# Patient Record
Sex: FEMALE | Race: WHITE | NOT HISPANIC OR LATINO | Employment: FULL TIME | URBAN - METROPOLITAN AREA
[De-identification: names, ages, dates, MRNs, and addresses within clinical notes are randomized per-mention and may not be internally consistent; named-entity substitution may affect disease eponyms.]

---

## 2017-05-11 ENCOUNTER — TRANSFERRED RECORDS (OUTPATIENT)
Dept: HEALTH INFORMATION MANAGEMENT | Facility: CLINIC | Age: 47
End: 2017-05-11

## 2018-07-30 ENCOUNTER — OFFICE VISIT - HEALTHEAST (OUTPATIENT)
Dept: UROLOGY | Facility: CLINIC | Age: 48
End: 2018-07-30

## 2018-07-30 DIAGNOSIS — N20.0 CALCULUS OF KIDNEY: ICD-10-CM

## 2018-07-30 DIAGNOSIS — N20.9 URINARY CALCULUS: ICD-10-CM

## 2018-07-30 DIAGNOSIS — N20.9 CALCULUS OF URINARY TRACT: ICD-10-CM

## 2018-07-30 DIAGNOSIS — E83.59 NEPHROCALCINOSIS: ICD-10-CM

## 2018-07-30 DIAGNOSIS — N29 NEPHROCALCINOSIS: ICD-10-CM

## 2018-07-30 LAB
ALBUMIN UR-MCNC: NEGATIVE MG/DL
APPEARANCE UR: CLEAR
BILIRUB UR QL STRIP: NEGATIVE
COLOR UR AUTO: YELLOW
GLUCOSE UR STRIP-MCNC: NEGATIVE MG/DL
HGB UR QL STRIP: ABNORMAL
KETONES UR STRIP-MCNC: NEGATIVE MG/DL
LEUKOCYTE ESTERASE UR QL STRIP: NEGATIVE
NITRATE UR QL: NEGATIVE
PH UR STRIP: 7 [PH] (ref 5–8)
SP GR UR STRIP: 1.01 (ref 1–1.03)
UROBILINOGEN UR STRIP-ACNC: ABNORMAL

## 2018-07-30 RX ORDER — ZOLPIDEM TARTRATE 10 MG/1
TABLET ORAL
Refills: 0 | Status: SHIPPED | COMMUNITY
Start: 2018-07-23

## 2018-07-30 RX ORDER — LEVOTHYROXINE SODIUM 100 MCG
100 TABLET ORAL DAILY
Refills: 0 | Status: SHIPPED | COMMUNITY
Start: 2018-07-23

## 2018-07-30 ASSESSMENT — MIFFLIN-ST. JEOR: SCORE: 1250.92

## 2019-10-08 ENCOUNTER — RECORDS - HEALTHEAST (OUTPATIENT)
Dept: ADMINISTRATIVE | Facility: OTHER | Age: 49
End: 2019-10-08

## 2019-11-11 ENCOUNTER — RECORDS - HEALTHEAST (OUTPATIENT)
Dept: ADMINISTRATIVE | Facility: OTHER | Age: 49
End: 2019-11-11

## 2019-11-11 LAB
CHOLEST SERPL-MCNC: 226 MG/DL (ref 0–200)
HDLC SERPL-MCNC: 55 MG/DL (ref 35–65)
LDLC SERPL CALC-MCNC: 144 MG/DL (ref 0–130)
TRIGLYCERIDES (HISTORICAL CONVERSION): 134 (ref 0–200)

## 2020-03-24 ENCOUNTER — RECORDS - HEALTHEAST (OUTPATIENT)
Dept: ADMINISTRATIVE | Facility: OTHER | Age: 50
End: 2020-03-24

## 2020-04-01 ENCOUNTER — COMMUNICATION - HEALTHEAST (OUTPATIENT)
Dept: UROLOGY | Facility: CLINIC | Age: 50
End: 2020-04-01

## 2020-04-17 ENCOUNTER — RECORDS - HEALTHEAST (OUTPATIENT)
Dept: HEALTH INFORMATION MANAGEMENT | Facility: CLINIC | Age: 50
End: 2020-04-17

## 2021-06-01 VITALS — HEIGHT: 61 IN | BODY MASS INDEX: 29.07 KG/M2 | WEIGHT: 154 LBS

## 2021-06-07 NOTE — TELEPHONE ENCOUNTER
Late entry for 3/31/20.  Patient called stating that she has been having visible blood in her urine after she exercises.  She has no pain or fever or any other symptoms.  She did go to her PCP and had some testing, which showed some blood in her urine, negative culture.  Patient will monitor her symptoms and will call [facility] if her symptoms do not resolve or if they change.  Imani Anand RN

## 2021-06-19 NOTE — PROGRESS NOTES
Assessment/Plan:        Diagnoses and all orders for this visit:    Nephrocalcinosis  -     Patient Stated Goal: Prevent further stones  -     Potassium Citrate Education  -     CT Abdomen Pelvis Without Oral Without IV Contrast; Future; Expected date: 7/30/19    Calculus of urinary tract  -     Urinalysis Macroscopic    Calculus of kidney    Urinary calculus    Other orders  -     SYNTHROID 100 mcg tablet; Take 100 mcg by mouth daily.; Refill: 0  -     zolpidem (AMBIEN) 10 mg tablet; TK 1 T PO QHS PRF SLEEP; Refill: 0      Stone Management Plan  [facility] 7/30/2018   Urinary Tract Infection No suspicion of infection   Renal Colic Well controlled symptoms   Renal Failure No suspicion of renal failure   Current CT date 7/26/2018   Right sided stones? Yes   R Number of ureteral stones No ureteral stones   R Number of kidney stones  > 10   R GSD of kidney stones 2 - 4   R Hydronephrosis None   R Stone Event No current event   R Current Plan Observe   Observe rationale Limited stone burden with good prognosis for spontaneous passage   Left sided stones? Yes   L Number of ureteral stones No ureteral stones   L Number of kidney stones  > 10   L GSD of kidney stones 2 - 4   L Hydronephrosis None   L Stone Event No current event   L Current Plan Observe   Observe rationale Limited stone burden with good prognosis for spontaneous passage             Subjective:      HPI  Ms. Maddie Andrew is a 48 y.o.  female returning to the [facility] [place] for remote follow up of stone disease, last seen 2013.    She has been having vague left flank pain for about a month. It is reminiscent of previous stone disease. She has had limited hematuria. Also having some vague shortness of breath. She has some upcoming travel and wants to be confident of current stone status.    CT from today is personally reviewed and demonstrates bilateral, diffuse low volume nephrocalcinosis. Essentially unchanged from  2013.    She is congratulated on the stability of her disease which she maintains through high fluid intake.     ROS   Review of systems is negative except for HPI.    Past Medical History:   Diagnosis Date     Celiac disease      Hypothyroidism      Kidney infection      Medullary sponge kidney      Sjogrens syndrome (H)        Past Surgical History:   Procedure Laterality Date     URETEROSCOPY         Current Outpatient Prescriptions   Medication Sig Dispense Refill     SYNTHROID 100 mcg tablet Take 100 mcg by mouth daily.  0     zolpidem (AMBIEN) 10 mg tablet TK 1 T PO QHS PRF SLEEP  0     No current facility-administered medications for this visit.        No Known Allergies    Social History     Social History     Marital status:      Spouse name: N/A     Number of children: N/A     Years of education: N/A     Occupational History     Teacher      Social History Main Topics     Smoking status: Never Smoker     Smokeless tobacco: Never Used     Alcohol use Yes      Comment: 1 x per week     Drug use: Not on file     Sexual activity: Not on file     Other Topics Concern     Not on file     Social History Narrative     No narrative on file       Family History   Problem Relation Age of Onset     Urolithiasis Father      Heart disease Father      Clotting disorder Neg Hx      Diabetes Neg Hx      Gout Neg Hx      Cancer Neg Hx      Objective:      Physical Exam  Vitals:    07/30/18 1429   BP: 141/77   Pulse: 67   Temp: 98.2  F (36.8  C)     General - well developed, well nourished, appropriate for age. Appears no distress at this time  Abdomen - mildly obese soft, non-tender, no hepatosplenomegaly, no masses.   - no flank tenderness, no suprapubic tenderness, kidney and bladder non-palpable  MSK - normal spinal curvature. no spinal tenderness. normal gait. muscular strength intact.  Psych - oriented to time, place, and person, normal mood and affect.      Labs   Urinalysis POC (Office):  Nitrite, UA   Date  Value Ref Range Status   07/30/2018 Negative Negative Final       Lab Urinalysis:  Blood, UA   Date Value Ref Range Status   07/30/2018 Trace (!) Negative Final     Nitrite, UA   Date Value Ref Range Status   07/30/2018 Negative Negative Final     Leukocytes, UA   Date Value Ref Range Status   07/30/2018 Negative Negative Final     pH, UA   Date Value Ref Range Status   07/30/2018 7.0 5.0 - 8.0 Final   06/16/2013 6.5 4.5 - 8.0 Final   06/10/2013 6.5 4.5 - 8.0 Final

## 2023-04-17 ENCOUNTER — LAB REQUISITION (OUTPATIENT)
Dept: LAB | Facility: CLINIC | Age: 53
End: 2023-04-17

## 2023-04-17 PROCEDURE — 88342 IMHCHEM/IMCYTCHM 1ST ANTB: CPT | Mod: TC | Performed by: DENTIST

## 2023-05-08 ENCOUNTER — TRANSFERRED RECORDS (OUTPATIENT)
Dept: HEALTH INFORMATION MANAGEMENT | Facility: CLINIC | Age: 53
End: 2023-05-08
Payer: COMMERCIAL

## 2023-05-12 ENCOUNTER — TRANSFERRED RECORDS (OUTPATIENT)
Dept: HEALTH INFORMATION MANAGEMENT | Facility: CLINIC | Age: 53
End: 2023-05-12
Payer: COMMERCIAL

## 2023-05-16 ENCOUNTER — MEDICAL CORRESPONDENCE (OUTPATIENT)
Dept: HEALTH INFORMATION MANAGEMENT | Facility: CLINIC | Age: 53
End: 2023-05-16
Payer: COMMERCIAL

## 2023-05-16 ENCOUNTER — TRANSFERRED RECORDS (OUTPATIENT)
Dept: HEALTH INFORMATION MANAGEMENT | Facility: CLINIC | Age: 53
End: 2023-05-16
Payer: COMMERCIAL

## 2023-05-23 ENCOUNTER — TELEPHONE (OUTPATIENT)
Dept: NEPHROLOGY | Facility: CLINIC | Age: 53
End: 2023-05-23
Payer: COMMERCIAL

## 2023-05-23 DIAGNOSIS — N25.89 RENAL TUBULAR ACIDOSIS: Primary | ICD-10-CM

## 2023-05-23 NOTE — TELEPHONE ENCOUNTER
Nephrology Patient Completing Outside Labs    Appointment Date & Time: 05/29/2023    Preferred Lab: [facility]    Preferred Lab Locations: [facility]) : [facility] [address], [place] [address]    Fax: [phone]    Phone Number: [phone]

## 2023-05-24 ENCOUNTER — TRANSCRIBE ORDERS (OUTPATIENT)
Dept: OTHER | Age: 53
End: 2023-05-24

## 2023-05-24 DIAGNOSIS — N20.0 KIDNEY STONE: ICD-10-CM

## 2023-05-24 DIAGNOSIS — R10.9 FLANK PAIN: Primary | ICD-10-CM

## 2023-05-24 NOTE — CONFIDENTIAL NOTE
DIAGNOSIS:  renal distal tubular acidosis   DATE RECEIVED: 05.31.2023    NOTES STATUS DETAILS   OFFICE NOTE from referring provider Received 05.24.2023 Jonah Mcgill MD w/ Rasheed godoy   OFFICE NOTE from other specialist      *Only VASCULITIS or LUPUS gather office notes for the following     *PULMONARY       *ENT     *DERMATOLOGY     *RHEUMATOLOGY     DISCHARGE SUMMARY from hospital     DISCHARGE REPORT from the ER     MEDICATION LIST Received 05.24.2023   IMAGING  (NEED IMAGES AND REPORTS)     KIDNEY CT SCAN Received 05.12.2023,  10.19.2021, 06.22.2020 CT Abd    KIDNEY ULTRASOUND     MR ABDOMEN     NUCLEAR MEDICINE RENAL     LABS     CBC     CMP     BMP     UA Received 03.24.2020   URINE PROTEIN Received 03.24.2020   RENAL PANEL     BIOPSY     KIDNEY BIOPSY         Action 05.24.2023 RM   Action Taken Pending records     Action 05.25.2023 RM   Action Taken Called [facility] to get CT images done on 5/12/2023 will be sending a CD, pending     Action 05.30.2023 RM   Action Taken Images received still waiting on 5/12/2023 image

## 2023-05-25 ENCOUNTER — TRANSFERRED RECORDS (OUTPATIENT)
Dept: HEALTH INFORMATION MANAGEMENT | Facility: CLINIC | Age: 53
End: 2023-05-25
Payer: COMMERCIAL

## 2023-05-27 ENCOUNTER — LAB (OUTPATIENT)
Dept: LAB | Facility: CLINIC | Age: 53
End: 2023-05-27
Payer: COMMERCIAL

## 2023-05-27 DIAGNOSIS — N25.89 RENAL TUBULAR ACIDOSIS: ICD-10-CM

## 2023-05-27 LAB
ALBUMIN MFR UR ELPH: <7 MG/DL (ref 1–14)
ALBUMIN SERPL-MCNC: 3.5 G/DL (ref 3.5–5)
ALBUMIN UR-MCNC: NEGATIVE MG/DL
ANION GAP SERPL CALCULATED.3IONS-SCNC: 8 MMOL/L (ref 5–18)
APPEARANCE UR: CLEAR
BILIRUB UR QL STRIP: NEGATIVE
BUN SERPL-MCNC: 15 MG/DL (ref 8–22)
CALCIUM SERPL-MCNC: 9.5 MG/DL (ref 8.5–10.5)
CHLORIDE BLD-SCNC: 100 MMOL/L (ref 98–107)
CO2 SERPL-SCNC: 28 MMOL/L (ref 22–31)
COLOR UR AUTO: COLORLESS
CREAT SERPL-MCNC: 0.83 MG/DL (ref 0.6–1.1)
CREAT UR-MCNC: 8 MG/DL
ERYTHROCYTE [DISTWIDTH] IN BLOOD BY AUTOMATED COUNT: 12.8 % (ref 10–15)
GFR SERPL CREATININE-BSD FRML MDRD: 84 ML/MIN/1.73M2
GLUCOSE BLD-MCNC: 81 MG/DL (ref 70–125)
GLUCOSE UR STRIP-MCNC: NEGATIVE MG/DL
HCT VFR BLD AUTO: 40.8 % (ref 35–47)
HGB BLD-MCNC: 13.6 G/DL (ref 11.7–15.7)
HGB UR QL STRIP: NEGATIVE
KETONES UR STRIP-MCNC: NEGATIVE MG/DL
LEUKOCYTE ESTERASE UR QL STRIP: NEGATIVE
MCH RBC QN AUTO: 29.3 PG (ref 26.5–33)
MCHC RBC AUTO-ENTMCNC: 33.3 G/DL (ref 31.5–36.5)
MCV RBC AUTO: 88 FL (ref 78–100)
NITRATE UR QL: NEGATIVE
PH UR STRIP: 7 [PH] (ref 5–7)
PHOSPHATE SERPL-MCNC: 2.9 MG/DL (ref 2.5–4.5)
PLATELET # BLD AUTO: 248 10E3/UL (ref 150–450)
POTASSIUM BLD-SCNC: 4.3 MMOL/L (ref 3.5–5)
PROT/CREAT 24H UR: NORMAL MG/G{CREAT}
PTH-INTACT SERPL-MCNC: 26 PG/ML (ref 15–65)
RBC # BLD AUTO: 4.64 10E6/UL (ref 3.8–5.2)
RBC URINE: 0 /HPF
SODIUM SERPL-SCNC: 136 MMOL/L (ref 136–145)
SP GR UR STRIP: 1 (ref 1–1.03)
UROBILINOGEN UR STRIP-MCNC: <2 MG/DL
WBC # BLD AUTO: 6.7 10E3/UL (ref 4–11)
WBC URINE: <1 /HPF

## 2023-05-27 PROCEDURE — 85027 COMPLETE CBC AUTOMATED: CPT

## 2023-05-27 PROCEDURE — 83970 ASSAY OF PARATHORMONE: CPT

## 2023-05-27 PROCEDURE — 84156 ASSAY OF PROTEIN URINE: CPT

## 2023-05-27 PROCEDURE — 36415 COLL VENOUS BLD VENIPUNCTURE: CPT

## 2023-05-27 PROCEDURE — 82306 VITAMIN D 25 HYDROXY: CPT

## 2023-05-27 PROCEDURE — 81001 URINALYSIS AUTO W/SCOPE: CPT

## 2023-05-27 PROCEDURE — 80069 RENAL FUNCTION PANEL: CPT

## 2023-05-29 LAB — DEPRECATED CALCIDIOL+CALCIFEROL SERPL-MC: 63 UG/L (ref 20–75)

## 2023-05-31 ENCOUNTER — TELEPHONE (OUTPATIENT)
Dept: NEPHROLOGY | Facility: CLINIC | Age: 53
End: 2023-05-31
Payer: COMMERCIAL

## 2023-05-31 ENCOUNTER — PRE VISIT (OUTPATIENT)
Dept: NEPHROLOGY | Facility: CLINIC | Age: 53
End: 2023-05-31
Payer: COMMERCIAL

## 2023-05-31 ENCOUNTER — VIRTUAL VISIT (OUTPATIENT)
Dept: NEPHROLOGY | Facility: CLINIC | Age: 53
End: 2023-05-31
Attending: STUDENT IN AN ORGANIZED HEALTH CARE EDUCATION/TRAINING PROGRAM
Payer: COMMERCIAL

## 2023-05-31 ENCOUNTER — MEDICAL CORRESPONDENCE (OUTPATIENT)
Dept: HEALTH INFORMATION MANAGEMENT | Facility: CLINIC | Age: 53
End: 2023-05-31
Payer: COMMERCIAL

## 2023-05-31 VITALS — WEIGHT: 140 LBS | BODY MASS INDEX: 26.45 KG/M2

## 2023-05-31 DIAGNOSIS — R10.9 FLANK PAIN: ICD-10-CM

## 2023-05-31 DIAGNOSIS — N20.0 KIDNEY STONE: ICD-10-CM

## 2023-05-31 PROCEDURE — 99205 OFFICE O/P NEW HI 60 MIN: CPT | Mod: VID | Performed by: STUDENT IN AN ORGANIZED HEALTH CARE EDUCATION/TRAINING PROGRAM

## 2023-05-31 RX ORDER — SEMAGLUTIDE 2.68 MG/ML
2 INJECTION, SOLUTION SUBCUTANEOUS
COMMUNITY
Start: 2023-05-04

## 2023-05-31 RX ORDER — PHENTERMINE HYDROCHLORIDE 15 MG/1
15 CAPSULE ORAL
COMMUNITY
Start: 2023-04-03

## 2023-05-31 ASSESSMENT — PAIN SCALES - GENERAL: PAINLEVEL: NO PAIN (0)

## 2023-05-31 NOTE — LETTER
5/31/2023       RE: Maddie Andrew  [address] [place] [address]     Dear Colleague,    Thank you for referring your patient, Maddie Andrew, to the [facility] [place] [facility]. Please see a copy of my visit note below.      Nephrology Clinic Visit  Maddie Andrew MRN: [id number] YOB: 1970  Primary Care Provider: Emer[facility] [place]    Video-Visit Details  Patient evaluated by billable video visit  Video Start Time: 12:54 PM  Type of service:  Video Visit  Video End Time:1:20PM  Originating Location (pt. Location): [facility]  Distant Location (provider location):  Off-site  Platform used for Video Visit: [employer]  ----------------------------------------------------------------------------------------------------------------------  Visit 5/31/2023:  -BP Control: No  --Current Regimen: None  -DM Control: No  --Current Regimen: None  -Creatinine Trend: 0.8  -Hematuria: Microscopic with stones  -Nephrolithiasis: Yes, see below  -NSAID Use: Ibuprofen rarely if having stone symptoms or has a procedure done  -Family History of Kidney Disease: Her Dad had 2 kidney stones, but no one else with kidney disease.   -Family/Friends on RRT/Kidney Transplant: No/No    -Other:  -Nephrolithiasis, Nephrocalcinosis, Autoimmune Hx:  --Has had multiple previous CT Abd/Pelvis done demonstrating b/l stones and nephrocalcinosis.  --Was on potassium citrate in the past (? Current on K citrate: No)  -Urgent care a few weeks ago with R sided kidney stone symptoms. CT at that time found Staghorn calculi b/l. She also thinks she passed a small stone.   -She has had a stone retrieval and stenting in the past this was around 2013. No lithotripsy since.   -She was last seen by Urology 2018.   -In the past: Potassium Citrate in the past (she reports she had lots of diarrhea with this)  -Not currently on any medications for stone prevention.   -In  2008 there were a lot of traumatic things going on in her life ( was sick). During this time she was diagnosed with celiac disease and Sjogren's syndrome and thyroid issues. She stopped for a while with kidney stuff when all this craziness was going on. She was drinking lots of citric   -Followed by [facility] in the past.   --She charts what she eats in an silver.    --We talked about Protein and Sodium intake  --About a gallon of water intake per day.  --The celiac was thought to be stress related. Sjogren's syndrome found after celiac. She follows with her general medicine doctor for her Sjogren's syndrome symptoms.     Objective:  PAST MEDICAL HISTORY:  No past medical history on file.   -Kidney Stones (x15 years or so)  -Nephrocalcinosis    PAST SURGICAL HISTORY:  Past Surgical History:   Procedure Laterality Date    URETEROSCOPY         MEDICATIONS:  Current Outpatient Medications   Medication Instructions    Synthroid 100 mcg, DAILY    zolpidem (AMBIEN) 10 mg tablet [ZOLPIDEM (AMBIEN) 10 MG TABLET] TK 1 T PO QHS PRF SLEEP       FAMILY MEDICAL HISTORY:   Family History   Problem Relation Age of Onset    Urolithiasis Father     Heart Disease Father     Clotting Disorder No family hx of     Diabetes No family hx of     Gout No family hx of     Cancer No family hx of        PHYSICAL EXAM:   There were no vitals taken for this visit.  Exam:  General: alert, oriented, conversant  Speaks in full sentences without audible dyspnea or wheeze  Neuro: normal speech  Psych: conversant, normal affect and thought process      LABS REVIEWED BY ME:   ANEMIA  Recent Labs   Lab Test 05/27/23  1030   HGB 13.6       BMP  Recent Labs   Lab Test 05/27/23  1030      POTASSIUM 4.3   CHLORIDE 100   CO2 28   ANIONGAP 8   BUN 15   CR 0.83   GFRESTIMATED 84       CBC  Recent Labs   Lab Test 05/27/23  1030   HGB 13.6   WBC 6.7   HCT 40.8   MCV 88          DIABETESNo lab results found.    HYPONATREMIANo lab results  found.    Invalid input(s): UOSM, OSM    MBD  Recent Labs   Lab Test 05/27/23  1030   PIERO 9.5   ALBUMIN 3.5   PHOS 2.9   PTHI 26        URINE STUDIES  Recent Labs   Lab Test 05/27/23  1030 07/30/18  1416   COLOR Colorless Yellow   APPEARANCE Clear Clear   URINEGLC Negative Negative   URINEBILI Negative Negative   URINEKETONE Negative Negative   SG 1.002 1.010   UBLD Negative Trace*   URINEPH 7.0 7.0   PROTEIN Negative Negative   UROBILINOGEN  --  0.2 E.U./dL   NITRITE Negative Negative   LEUKEST Negative Negative   RBCU 0  --    WBCU <1  --      No lab results found.    ADDITIONAL LABS ORDERED/REVIEWED BY ME:  See below    Assessment/Plan  CKD Stage G1A1  Established care with [facility] 5/31/2023    History of Nephrocalcinosis and recurrent stones. Has previous followed with Urology. Has previously been on K-citrate. There is some discussion in the chart about RTA. There are a few BMPs iback in 2013 with a non-gap met acidosis (mild, bicarb was 19). There is also mention of Sjogren's Syndrome (which could be an underlying cause of RTA) in the chart. She's had a postive REGINALD, Ro/La in 2003. Stone analysis done in 2013 showed 100% calcium phosphate.     CKD Etiology (Biopsy Proven: No):  -Nephrocalcinosis and Recurrent Stones with current b/l stones (staghorn morphology >1cm b/l)    Plan:  -Referral to Urology given b/l stones >1cm  -Goal Fluid intake around 2.5L qDay  -Litholink to update stone risk factors (hypercalciuria, hypocit, etc) pending these findings we can proceed with further workup for nephrocalcinosis.   -Pending findings will need to consider thiazide diuretic (to reduce calciuria) vs K-citrate (although urine pH currently 7 and bicarb above goal so need to take that into consideration prior to starting)  -Low Na diet stressed. Aim for <2.4gm  -MyChart sent to aim for less than 0.8g/kg/day of protein intake (although evidence base for this isn't the greatest)  -Also may need to consider low oxalate  diet once we have litholink results.     Return to clinic: 2 months    Hesham Aguilar MD   of Medicine  Division of Nephrology and Hypertension  [facility]    60 minutes spent on the date of the encounter doing chart review, history and exam, documentation and further activities as noted above      Again, thank you for allowing me to participate in the care of your patient.      Sincerely,    Hesham Aguilar MD

## 2023-05-31 NOTE — PROGRESS NOTES
[facility]      Nephrology Clinic Visit  Maddie Andrew MRN: [id number] YOB: 1970  Primary Care Provider: [facility]    Video-Visit Details  Patient evaluated by billable video visit  Video Start Time: 12:54 PM  Type of service:  Video Visit  Video End Time:1:20PM  Originating Location (pt. Location): [facility]  Distant Location (provider location):  Off-site  Platform used for Video Visit: EverPresentWell  ----------------------------------------------------------------------------------------------------------------------  Visit 5/31/2023:  -BP Control: No  --Current Regimen: None  -DM Control: No  --Current Regimen: None  -Creatinine Trend: 0.8  -Hematuria: Microscopic with stones  -Nephrolithiasis: Yes, see below  -NSAID Use: Ibuprofen rarely if having stone symptoms or has a procedure done  -Family History of Kidney Disease: Her Dad had 2 kidney stones, but no one else with kidney disease.   -Family/Friends on RRT/Kidney Transplant: No/No    -Other:  -Nephrolithiasis, Nephrocalcinosis, Autoimmune Hx:  --Has had multiple previous CT Abd/Pelvis done demonstrating b/l stones and nephrocalcinosis.  --Was on potassium citrate in the past (? Current on K citrate: No)  -Urgent care a few weeks ago with R sided kidney stone symptoms. CT at that time found Staghorn calculi b/l. She also thinks she passed a small stone.   -She has had a stone retrieval and stenting in the past this was around 2013. No lithotripsy since.   -She was last seen by Urology 2018.   -In the past: Potassium Citrate in the past (she reports she had lots of diarrhea with this)  -Not currently on any medications for stone prevention.   -In 2008 there were a lot of traumatic things going on in her life ( was sick). During this time she was diagnosed with celiac disease and Sjogren's syndrome and thyroid issues. She stopped for a while with kidney stuff when all this craziness was going on. She was drinking lots of citric    -Followed by [place] in the past.   --She charts what she eats in an silver.    --We talked about Protein and Sodium intake  --About a gallon of water intake per day.  --The celiac was thought to be stress related. Sjogren's syndrome found after celiac. She follows with her general medicine doctor for her Sjogren's syndrome symptoms.     Objective:  PAST MEDICAL HISTORY:  No past medical history on file.   -Kidney Stones (x15 years or so)  -Nephrocalcinosis    PAST SURGICAL HISTORY:  Past Surgical History:   Procedure Laterality Date     URETEROSCOPY         MEDICATIONS:  Current Outpatient Medications   Medication Instructions     Synthroid 100 mcg, DAILY     zolpidem (AMBIEN) 10 mg tablet [ZOLPIDEM (AMBIEN) 10 MG TABLET] TK 1 T PO QHS PRF SLEEP       FAMILY MEDICAL HISTORY:   Family History   Problem Relation Age of Onset     Urolithiasis Father      Heart Disease Father      Clotting Disorder No family hx of      Diabetes No family hx of      Gout No family hx of      Cancer No family hx of        PHYSICAL EXAM:   There were no vitals taken for this visit.  Exam:  General: alert, oriented, conversant  Speaks in full sentences without audible dyspnea or wheeze  Neuro: normal speech  Psych: conversant, normal affect and thought process      LABS REVIEWED BY ME:   ANEMIA  Recent Labs   Lab Test 05/27/23  1030   HGB 13.6       BMP  Recent Labs   Lab Test 05/27/23  1030      POTASSIUM 4.3   CHLORIDE 100   CO2 28   ANIONGAP 8   BUN 15   CR 0.83   GFRESTIMATED 84       CBC  Recent Labs   Lab Test 05/27/23  1030   HGB 13.6   WBC 6.7   HCT 40.8   MCV 88          DIABETESNo lab results found.    HYPONATREMIANo lab results found.    Invalid input(s): UOSM, OSM    MBD  Recent Labs   Lab Test 05/27/23  1030   PIERO 9.5   ALBUMIN 3.5   PHOS 2.9   PTHI 26        URINE STUDIES  Recent Labs   Lab Test 05/27/23  1030 07/30/18  1416   COLOR Colorless Yellow   APPEARANCE Clear Clear   URINEGLC Negative Negative   URINEBILI  Negative Negative   URINEKETONE Negative Negative   SG 1.002 1.010   UBLD Negative Trace*   URINEPH 7.0 7.0   PROTEIN Negative Negative   UROBILINOGEN  --  0.2 E.U./dL   NITRITE Negative Negative   LEUKEST Negative Negative   RBCU 0  --    WBCU <1  --      No lab results found.    ADDITIONAL LABS ORDERED/REVIEWED BY ME:  See below    Assessment/Plan  CKD Stage G1A1  Established care with [facility] 5/31/2023    History of Nephrocalcinosis and recurrent stones. Has previous followed with Urology. Has previously been on K-citrate. There is some discussion in the chart about RTA. There are a few BMPs iback in 2013 with a non-gap met acidosis (mild, bicarb was 19). There is also mention of Sjogren's Syndrome (which could be an underlying cause of RTA) in the chart. She's had a postive REGINALD, Ro/La in 2003. Stone analysis done in 2013 showed 100% calcium phosphate.     CKD Etiology (Biopsy Proven: No):  -Nephrocalcinosis and Recurrent Stones with current b/l stones (staghorn morphology >1cm b/l)    Plan:  -Referral to Urology given b/l stones >1cm  -Goal Fluid intake around 2.5L qDay  -Litholink to update stone risk factors (hypercalciuria, hypocit, etc) pending these findings we can proceed with further workup for nephrocalcinosis.   -Pending findings will need to consider thiazide diuretic (to reduce calciuria) vs K-citrate (although urine pH currently 7 and bicarb above goal so need to take that into consideration prior to starting)  -Low Na diet stressed. Aim for <2.4gm  -MyChart sent to aim for less than 0.8g/kg/day of protein intake (although evidence base for this isn't the greatest)  -Also may need to consider low oxalate diet once we have litholink results.     Return to clinic: 2 months    Hesham Aguilar MD   of Medicine  Division of Nephrology and Hypertension  [facility]    60 minutes spent on the date of the encounter doing chart review, history and exam,  documentation and further activities as noted above

## 2023-05-31 NOTE — NURSING NOTE
Is the patient currently in the state of [place]? YES    Visit mode:VIDEO    If the visit is dropped, the patient can be reconnected by: VIDEO VISIT: Text to cell phone: [phone]    Will anyone else be joining the visit? NO      How would you like to obtain your AVS? MyChart    Are changes needed to the allergy or medication list? NO    Reason for visit: No chief complaint on file.

## 2023-05-31 NOTE — TELEPHONE ENCOUNTER
Per Dr Lauren Wells request faxed.  Sent to be scanned  Evelyn Dunaway LPN  Nephrology  [phone]

## 2023-05-31 NOTE — PATIENT INSTRUCTIONS
"It was a pleasure to see you in nephrology clinic today. I've included a brief summary of our discussion and care plan from today's visit below.  _______________________________________________________________________    My recommendations are summarized as follows:  -Keep a Blood Pressure log. Please make sure that you are using a validated blood pressure device (check \"www.validatebp.org\").  -Avoid all NSAID's. Examples include Ibuprofen (Advil, Motrin), naprosyn (Aleve), celebrex among others. Acetaminophen (Tylenol) is ok with maximum dose in 24 hours of 3000mg.  -Healthy lifestyle measures will keep your kidney's functioning at their current best. This includes regular exercise, maintaining a healthy body weight and smoking cessation.   -We are going to do a Litholink to evaluate your stone formation risk factors. This will allow us to determine appropriate therapy to try and minimize future stone formation  -I am going to ask our Urology colleagues to see you. I think it will be very helpful to have a urologist that knows you and can help if obstructing stones form in the future.   -Please continue to drink lots of water everyday. My goal for you is at least 2-2.5L of urine output per day  -Please do your best to minimize your sodium intake. I would aim for <2.4grams of sodium per day  -I will try to find some exact recommendations for protein intake for you and send you a [employer] with this information.     Please return to Nephrology Clinic in 8 weeks to review your progress.     Who do I call with any questions after my visit?  There are multiple ways to contact your nephrology care team:    -To schedule or reschedule an appointment, please call [phone].  -Reach out via [employer]r. These messages are answered by your nurse or doctor during business hours and typically in 1-2 days. [employer] messages are best for quick questions/clarifications/updates. Frequently, your doctor or nurse will recommend setting " up a follow up appointment to address any significant questions/concerns.  -For urgent questions after business hours, you may reach the on-call Nephrology Fellow by contacting the [facility]  at [phone].    To schedule imaging:   -Please call [phone]     To schedule your lab appointment at the Clinics and Surgery Center:  -Please call [phone]    Sincerely,    Dr. Hesham Aguilar   of Medicine  Division of Nephrology and Hypertension  [facility]

## 2023-05-31 NOTE — TELEPHONE ENCOUNTER
----- Message from Hesham Aguilar MD sent at 5/31/2023  1:42 PM CDT -----  Regarding: Litholink  Good afternoon,    Can you please help arrange for this patient to have a Litholink done?    Thank you!

## 2023-06-01 ENCOUNTER — TELEPHONE (OUTPATIENT)
Dept: NEPHROLOGY | Facility: CLINIC | Age: 53
End: 2023-06-01
Payer: COMMERCIAL

## 2023-06-01 ENCOUNTER — DOCUMENTATION ONLY (OUTPATIENT)
Dept: NEPHROLOGY | Facility: CLINIC | Age: 53
End: 2023-06-01
Payer: COMMERCIAL

## 2023-06-01 NOTE — PROGRESS NOTES
Action 06.01.2023    Action Taken Image received via disc from [employer] took to 4n to be uploaded to chart. Image received CT Abd dated 05/12/2023

## 2023-06-01 NOTE — TELEPHONE ENCOUNTER
No solution found without overruling, sent message to pool.    Appointment type: 9 week follow up video  Provider: Dr. Hesham Aguilar  Return date: first week of August 2023  Specialty phone number: n/a  Additional appointment(s) needed: please schedule lab appointment one week prior to follow up  Additonal Notes: n/a

## 2023-06-04 ENCOUNTER — HEALTH MAINTENANCE LETTER (OUTPATIENT)
Age: 53
End: 2023-06-04

## 2023-06-07 ENCOUNTER — TELEPHONE (OUTPATIENT)
Dept: NEPHROLOGY | Facility: CLINIC | Age: 53
End: 2023-06-07
Payer: COMMERCIAL

## 2023-06-07 NOTE — TELEPHONE ENCOUNTER
LVM & sent mychart // pt needs to schedule in-person 8 week return nephrology visit with Dr. Aguilar around 7.26.23 with labs prior // first attempt, AN 6.7.23

## 2023-06-13 ENCOUNTER — MEDICAL CORRESPONDENCE (OUTPATIENT)
Dept: NEPHROLOGY | Facility: CLINIC | Age: 53
End: 2023-06-13
Payer: COMMERCIAL

## 2023-06-29 NOTE — TELEPHONE ENCOUNTER
MEDICAL RECORDS REQUEST   [facility]  Urology Clinic  [address] [place] [address]  PHONE: [phone]  Fax: [phone]        FUTURE VISIT INFORMATION                                                   Justo Shireen Rodriguez, : 1967 scheduled for future visit at [facility]    APPOINTMENT INFORMATION:    Date: 2023    Provider:  Pamela Edmond CNP    Reason for Visit/Diagnosis: STONE PREVENTION    REFERRAL INFORMATION:    Referring provider:  Hesham Aguilar MD in  MEDICINE RENAL    RECORDS REQUESTED FOR VISIT                                                     NOTES  STATUS/DETAILS   OFFICE NOTE from referring provider  yes, 2023 -- Hesham Aguilar MD in  MEDICINE RENAL   OFFICE NOTE from other specialist  yes   MEDICATION LIST  yes   LABS     URINALYSIS (UA)  yes   KIDNEY STONE     CT ABD PELVIS  yes, 2023, 10/19/2021 -- CT ABD PELVIS     PRE-VISIT CHECKLIST      Joint diagnostic appointment coordinated correctly          (ensure right order & amount of time) Yes   RECORD COLLECTION COMPLETE Yes

## 2023-07-21 ENCOUNTER — PRE VISIT (OUTPATIENT)
Dept: UROLOGY | Facility: CLINIC | Age: 53
End: 2023-07-21

## 2024-03-02 ENCOUNTER — HEALTH MAINTENANCE LETTER (OUTPATIENT)
Age: 54
End: 2024-03-02

## 2024-07-20 ENCOUNTER — HEALTH MAINTENANCE LETTER (OUTPATIENT)
Age: 54
End: 2024-07-20

## 2025-08-09 ENCOUNTER — HEALTH MAINTENANCE LETTER (OUTPATIENT)
Age: 55
End: 2025-08-09